# Patient Record
Sex: FEMALE | Race: WHITE | NOT HISPANIC OR LATINO | Employment: UNEMPLOYED | URBAN - METROPOLITAN AREA
[De-identification: names, ages, dates, MRNs, and addresses within clinical notes are randomized per-mention and may not be internally consistent; named-entity substitution may affect disease eponyms.]

---
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
## 2024-07-11 ENCOUNTER — HOSPITAL ENCOUNTER (EMERGENCY)
Facility: HOSPITAL | Age: 19
Discharge: HOME | End: 2024-07-11
Attending: EMERGENCY MEDICINE
Payer: COMMERCIAL

## 2024-07-11 ENCOUNTER — OFFICE VISIT (OUTPATIENT)
Dept: URGENT CARE | Facility: MEDICAL CENTER | Age: 19
End: 2024-07-11
Payer: COMMERCIAL

## 2024-07-11 ENCOUNTER — APPOINTMENT (EMERGENCY)
Dept: CT IMAGING | Facility: HOSPITAL | Age: 19
End: 2024-07-11
Payer: COMMERCIAL

## 2024-07-11 VITALS
TEMPERATURE: 98.3 F | DIASTOLIC BLOOD PRESSURE: 78 MMHG | OXYGEN SATURATION: 97 % | HEART RATE: 101 BPM | SYSTOLIC BLOOD PRESSURE: 130 MMHG | WEIGHT: 199 LBS | RESPIRATION RATE: 18 BRPM

## 2024-07-11 VITALS
OXYGEN SATURATION: 97 % | WEIGHT: 198.41 LBS | RESPIRATION RATE: 16 BRPM | DIASTOLIC BLOOD PRESSURE: 79 MMHG | TEMPERATURE: 98.4 F | HEART RATE: 88 BPM | SYSTOLIC BLOOD PRESSURE: 132 MMHG

## 2024-07-11 DIAGNOSIS — J03.90 ACUTE TONSILLITIS, UNSPECIFIED ETIOLOGY: Primary | ICD-10-CM

## 2024-07-11 DIAGNOSIS — J03.90 TONSILLITIS: Primary | ICD-10-CM

## 2024-07-11 LAB
ANION GAP SERPL CALCULATED.3IONS-SCNC: 7 MMOL/L (ref 4–13)
BASOPHILS # BLD MANUAL: 0 THOUSAND/UL (ref 0–0.1)
BASOPHILS NFR MAR MANUAL: 0 % (ref 0–1)
BUN SERPL-MCNC: 9 MG/DL (ref 5–25)
CALCIUM SERPL-MCNC: 9.3 MG/DL (ref 8.4–10.2)
CHLORIDE SERPL-SCNC: 102 MMOL/L (ref 96–108)
CO2 SERPL-SCNC: 27 MMOL/L (ref 21–32)
CREAT SERPL-MCNC: 0.77 MG/DL (ref 0.6–1.3)
EOSINOPHIL # BLD MANUAL: 0 THOUSAND/UL (ref 0–0.4)
EOSINOPHIL NFR BLD MANUAL: 0 % (ref 0–6)
ERYTHROCYTE [DISTWIDTH] IN BLOOD BY AUTOMATED COUNT: 13.5 % (ref 11.6–15.1)
EXT PREGNANCY TEST URINE: NEGATIVE
EXT. CONTROL: NORMAL
GFR SERPL CREATININE-BSD FRML MDRD: 112 ML/MIN/1.73SQ M
GLUCOSE SERPL-MCNC: 109 MG/DL (ref 65–140)
HCT VFR BLD AUTO: 42.1 % (ref 34.8–46.1)
HGB BLD-MCNC: 13.8 G/DL (ref 11.5–15.4)
LYMPHOCYTES # BLD AUTO: 65 % (ref 14–44)
LYMPHOCYTES # BLD AUTO: 7.18 THOUSAND/UL (ref 0.6–4.47)
MCH RBC QN AUTO: 27.8 PG (ref 26.8–34.3)
MCHC RBC AUTO-ENTMCNC: 32.8 G/DL (ref 31.4–37.4)
MCV RBC AUTO: 85 FL (ref 82–98)
MONOCYTES # BLD AUTO: 0.42 THOUSAND/UL (ref 0–1.22)
MONOCYTES NFR BLD: 4 % (ref 4–12)
NEUTROPHILS # BLD MANUAL: 2.96 THOUSAND/UL (ref 1.85–7.62)
NEUTS BAND NFR BLD MANUAL: 1 % (ref 0–8)
NEUTS SEG NFR BLD AUTO: 27 % (ref 43–75)
PLATELET # BLD AUTO: 108 THOUSANDS/UL (ref 149–390)
PLATELET BLD QL SMEAR: ABNORMAL
PMV BLD AUTO: 12.1 FL (ref 8.9–12.7)
POTASSIUM SERPL-SCNC: 4 MMOL/L (ref 3.5–5.3)
RBC # BLD AUTO: 4.96 MILLION/UL (ref 3.81–5.12)
RBC MORPH BLD: NORMAL
SODIUM SERPL-SCNC: 136 MMOL/L (ref 135–147)
VARIANT LYMPHS # BLD AUTO: 3 %
WBC # BLD AUTO: 10.56 THOUSAND/UL (ref 4.31–10.16)

## 2024-07-11 PROCEDURE — 99284 EMERGENCY DEPT VISIT MOD MDM: CPT

## 2024-07-11 PROCEDURE — 80048 BASIC METABOLIC PNL TOTAL CA: CPT | Performed by: PHYSICIAN ASSISTANT

## 2024-07-11 PROCEDURE — 86308 HETEROPHILE ANTIBODY SCREEN: CPT | Performed by: PHYSICIAN ASSISTANT

## 2024-07-11 PROCEDURE — 70491 CT SOFT TISSUE NECK W/DYE: CPT

## 2024-07-11 PROCEDURE — 85027 COMPLETE CBC AUTOMATED: CPT | Performed by: PHYSICIAN ASSISTANT

## 2024-07-11 PROCEDURE — 36415 COLL VENOUS BLD VENIPUNCTURE: CPT | Performed by: PHYSICIAN ASSISTANT

## 2024-07-11 PROCEDURE — 81025 URINE PREGNANCY TEST: CPT | Performed by: PHYSICIAN ASSISTANT

## 2024-07-11 PROCEDURE — 99285 EMERGENCY DEPT VISIT HI MDM: CPT | Performed by: PHYSICIAN ASSISTANT

## 2024-07-11 PROCEDURE — 96360 HYDRATION IV INFUSION INIT: CPT

## 2024-07-11 PROCEDURE — 85007 BL SMEAR W/DIFF WBC COUNT: CPT | Performed by: PHYSICIAN ASSISTANT

## 2024-07-11 PROCEDURE — 99213 OFFICE O/P EST LOW 20 MIN: CPT | Performed by: NURSE PRACTITIONER

## 2024-07-11 RX ORDER — AMOXICILLIN AND CLAVULANATE POTASSIUM 875; 125 MG/1; MG/1
1 TABLET, FILM COATED ORAL 2 TIMES DAILY
COMMUNITY
Start: 2024-07-08 | End: 2024-07-15

## 2024-07-11 RX ORDER — PREDNISONE 20 MG/1
20 TABLET ORAL DAILY
COMMUNITY
Start: 2024-07-08 | End: 2024-07-13

## 2024-07-11 RX ADMIN — IOHEXOL 85 ML: 350 INJECTION, SOLUTION INTRAVENOUS at 15:54

## 2024-07-11 RX ADMIN — SODIUM CHLORIDE 1000 ML: 0.9 INJECTION, SOLUTION INTRAVENOUS at 15:14

## 2024-07-11 NOTE — PROGRESS NOTES
[facility]        NAME: Sofy Sanchez is a 18 y.o. female  : 2005    MRN: [id number]  DATE: 2024  TIME: 2:10 PM    Assessment and Plan   Acute tonsillitis, unspecified etiology [J03.90]  1. Acute tonsillitis, unspecified etiology  Transfer to other facility        Patient in NAD and VSS upon exam. Discussed with mom and patient exam findings, concerned for abscess and failure of outpatient therapy with worsening symptoms. Recommend evaluation in ED with possible CT imaging. Mom and patient agreeable to plan and will go to [facility].     Patient Instructions       Follow up with PCP in 3-5 days.  Proceed to  ER if symptoms worsen.    If tests have been performed at [facility], our office will contact you with results if changes need to be made to the care plan discussed with you at the visit.  You can review your full results on [facility].    Chief Complaint     Chief Complaint   Patient presents with    Sore Throat     Sore throat x1 week. Pt was previously seen at Urgent Care & was given antibiotics and steroids. Pt here today with worsening sore throat and feels the meds are not helping her.          History of Present Illness       Patient seen at  on  and tested negative for strep and was given prednisone and augmentin  Patient has been on PO prednisone and Augmentin x 4 days with no improvement  Patient reports swelling has gotten worse  Denies fevers, cough  + mild congestion  Mom reports patient had wisdom teeth surgery and another tooth removed approx 9 months ago        Review of Systems   Review of Systems   HENT:  Positive for congestion, sore throat and trouble swallowing.    All other systems reviewed and are negative.        Current Medications       Current Outpatient Medications:     amoxicillin-clavulanate (AUGMENTIN) 875-125 mg per tablet, Take 1 tablet by mouth 2 (two) times a day, Disp: , Rfl:     predniSONE 20 mg tablet, Take 20 mg by mouth  daily, Disp: , Rfl:     MULTIPLE VITAMIN PO, Take by mouth, Disp: , Rfl:     norgestimate-ethinyl estradiol (Tri Femynor) 0.18/0.215/0.25 MG-35 MCG per tablet, Take 1 tablet by mouth daily, Disp: 90 tablet, Rfl: 0    Current Allergies     Allergies as of 07/11/2024    (No Known Allergies)            The following portions of the patient's history were reviewed and updated as appropriate: allergies, current medications, past family history, past medical history, past social history, past surgical history and problem list.     Past Medical History:   Diagnosis Date    Depression        Past Surgical History:   Procedure Laterality Date    NO PAST SURGERIES         Family History   Problem Relation Age of Onset    Hyperparathyroidism Mother     Hypothyroidism Mother     No Known Problems Father     Diabetes Maternal Grandmother     Heart disease Maternal Grandfather          Medications have been verified.        Objective   /78   Pulse 101   Temp 98.3 °F (36.8 °C)   Resp 18   Wt 90.3 kg (199 lb)   LMP 06/23/2024 (Approximate)   SpO2 97%   Patient's last menstrual period was 06/23/2024 (approximate).       Physical Exam     Physical Exam  Constitutional:       General: She is not in acute distress.     Appearance: Normal appearance. She is not ill-appearing.      Comments: Patient appears uncomfortable and tearful   HENT:      Head: Normocephalic and atraumatic.      Mouth/Throat:      Lips: Pink.      Mouth: Mucous membranes are moist.      Pharynx: Pharyngeal swelling, oropharyngeal exudate and posterior oropharyngeal erythema present.      Tonsils: 4+ on the right. 2+ on the left.      Comments: Right tonsil almost passing midline and is touching uvula  Neck:     Cardiovascular:      Rate and Rhythm: Normal rate.   Pulmonary:      Effort: Pulmonary effort is normal.   Lymphadenopathy:      Cervical: Cervical adenopathy present.   Skin:     General: Skin is warm and dry.   Neurological:      Mental  Status: She is alert.

## 2024-07-11 NOTE — Clinical Note
Sofy Sanchez was seen and treated in our emergency department on 7/11/2024.                Diagnosis:     Sofy  may return to school on return date.    She may return on this date: 07/15/2024         If you have any questions or concerns, please don't hesitate to call.      Gloria Lee PA-C    ______________________________           _______________          _______________  Hospital Representative                              Date                                Time

## 2024-07-11 NOTE — ED PROVIDER NOTES
History  Chief Complaint   Patient presents with    Medical Problem     +5 tonsils w white pus to R side. Steroids began 4 days ago without any improvement. Sent from urgent care for CT scan. Chills associated.     Sofy Sanchez is a 18 y.o. female with no significant past medical history presenting to ER complaining of sore throat over the past week.  Patient reports that about a week ago she began with bodyaches, headaches, chills, subjective fevers, and sore throat.  She was seen at an urgent care where she had a negative strep test but still started on Augmentin and prednisone.  Patient has been compliant with medications and has 1 dose of Augmentin 1 dose of prednisone left.  She reports that over the past week all of her symptoms have improved however she feels like her throat swelling has persisted.  Today she returned to the urgent care and they recommended she present to the ER to rule out peritonsillar abscess considering the swelling on the right tonsil.  Patient currently denies any sore throat, headache, body ache, chills, fevers, difficulty swallowing, difficulty speaking, difficulty breathing, or voice change.  She reports she has been eating and drinking normally.  She denies any pain medication.        Prior to Admission Medications   Prescriptions Last Dose Informant Patient Reported? Taking?   MULTIPLE VITAMIN PO   Yes No   Sig: Take by mouth   amoxicillin-clavulanate (AUGMENTIN) 875-125 mg per tablet   Yes No   Sig: Take 1 tablet by mouth 2 (two) times a day   norgestimate-ethinyl estradiol (Tri Femynor) 0.18/0.215/0.25 MG-35 MCG per tablet   No No   Sig: Take 1 tablet by mouth daily   predniSONE 20 mg tablet   Yes No   Sig: Take 20 mg by mouth daily      Facility-Administered Medications: None       Past Medical History:   Diagnosis Date    Depression        Past Surgical History:   Procedure Laterality Date    NO PAST SURGERIES         Family History   Problem Relation Age of Onset     Hyperparathyroidism Mother     Hypothyroidism Mother     No Known Problems Father     Diabetes Maternal Grandmother     Heart disease Maternal Grandfather      I have reviewed and agree with the history as documented.    E-Cigarette/Vaping     E-Cigarette/Vaping Substances     Social History     Tobacco Use    Smoking status: Never    Smokeless tobacco: Never   Substance Use Topics    Alcohol use: Never    Drug use: Never       Review of Systems   Constitutional:  Positive for chills (resolved). Negative for fever.   HENT:  Positive for sore throat (resolved). Negative for ear pain, trouble swallowing and voice change.    Eyes:  Negative for pain and visual disturbance.   Respiratory:  Negative for cough and shortness of breath.    Cardiovascular:  Negative for chest pain and palpitations.   Gastrointestinal:  Negative for abdominal pain and vomiting.   Genitourinary:  Negative for dysuria and hematuria.   Musculoskeletal:  Positive for myalgias (resolved). Negative for arthralgias and back pain.   Skin:  Negative for color change and rash.   Neurological:  Negative for seizures and syncope.   All other systems reviewed and are negative.      Physical Exam  Physical Exam  Vitals and nursing note reviewed.   Constitutional:       General: She is not in acute distress.     Appearance: She is well-developed. She is not ill-appearing, toxic-appearing or diaphoretic.   HENT:      Head: Normocephalic and atraumatic.      Right Ear: Tympanic membrane normal.      Left Ear: Tympanic membrane normal.      Mouth/Throat:      Lips: Pink. No lesions.      Mouth: Mucous membranes are moist. No injury, oral lesions or angioedema.      Tongue: No lesions. Tongue does not deviate from midline.      Palate: No mass and lesions.      Pharynx: Uvula midline. Oropharyngeal exudate and posterior oropharyngeal erythema present. No uvula swelling.      Tonsils: Tonsillar exudate present. 3+ on the right. 2+ on the left.   Eyes:       Conjunctiva/sclera: Conjunctivae normal.   Cardiovascular:      Rate and Rhythm: Normal rate and regular rhythm.      Heart sounds: No murmur heard.  Pulmonary:      Effort: Pulmonary effort is normal. No respiratory distress.      Breath sounds: Normal breath sounds. No stridor. No wheezing, rhonchi or rales.   Abdominal:      Palpations: Abdomen is soft.      Tenderness: There is no abdominal tenderness.   Musculoskeletal:         General: No swelling.      Cervical back: Neck supple.   Skin:     General: Skin is warm and dry.      Capillary Refill: Capillary refill takes less than 2 seconds.   Neurological:      Mental Status: She is alert.   Psychiatric:         Mood and Affect: Mood normal.         Vital Signs  ED Triage Vitals [07/11/24 1426]   Temperature Pulse Respirations Blood Pressure SpO2   98.4 °F (36.9 °C) 88 16 132/79 97 %      Temp Source Heart Rate Source Patient Position - Orthostatic VS BP Location FiO2 (%)   Oral Monitor Sitting Right arm --      Pain Score       No Pain           Vitals:    07/11/24 1426   BP: 132/79   Pulse: 88   Patient Position - Orthostatic VS: Sitting         Visual Acuity      ED Medications  Medications   sodium chloride 0.9 % bolus 1,000 mL (0 mL Intravenous Stopped 7/11/24 1634)   iohexol (OMNIPAQUE) 350 MG/ML injection (SINGLE-DOSE) 85 mL (85 mL Intravenous Given 7/11/24 1554)       Diagnostic Studies  Results Reviewed       Procedure Component Value Units Date/Time    Mononucleosis screen [[id number]]  (Abnormal) Collected: 07/11/24 1511    Lab Status: Final result Specimen: Blood from Arm, Left Updated: 07/12/24 0001     Monotest Positive    Manual Differential(PHLEBS Do Not Order) [[id number]]  (Abnormal) Collected: 07/11/24 1511    Lab Status: Final result Specimen: Blood from Arm, Left Updated: 07/11/24 1801     Segmented % 27 %      Bands % 1 %      Lymphocytes % 65 %      Monocytes % 4 %      Eosinophils % 0 %      Basophils % 0 %      Atypical Lymphocytes % 3 %       Absolute Neutrophils 2.96 Thousand/uL      Absolute Lymphocytes 7.18 Thousand/uL      Absolute Monocytes 0.42 Thousand/uL      Absolute Eosinophils 0.00 Thousand/uL      Absolute Basophils 0.00 Thousand/uL      Total Counted --     RBC Morphology Normal     Platelet Estimate Borderline    Basic metabolic panel [[id number]] Collected: 07/11/24 1511    Lab Status: Final result Specimen: Blood from Arm, Left Updated: 07/11/24 1533     Sodium 136 mmol/L      Potassium 4.0 mmol/L      Chloride 102 mmol/L      CO2 27 mmol/L      ANION GAP 7 mmol/L      BUN 9 mg/dL      Creatinine 0.77 mg/dL      Glucose 109 mg/dL      Calcium 9.3 mg/dL      eGFR 112 ml/min/1.73sq m     Narrative:      National Kidney Disease Foundation guidelines for Chronic Kidney Disease (CKD):     Stage 1 with normal or high GFR (GFR > 90 mL/min/1.73 square meters)    Stage 2 Mild CKD (GFR = 60-89 mL/min/1.73 square meters)    Stage 3A Moderate CKD (GFR = 45-59 mL/min/1.73 square meters)    Stage 3B Moderate CKD (GFR = 30-44 mL/min/1.73 square meters)    Stage 4 Severe CKD (GFR = 15-29 mL/min/1.73 square meters)    Stage 5 End Stage CKD (GFR <15 mL/min/1.73 square meters)  Note: GFR calculation is accurate only with a steady state creatinine    CBC and differential [[id number]]  (Abnormal) Collected: 07/11/24 1511    Lab Status: Final result Specimen: Blood from Arm, Left Updated: 07/11/24 1530     WBC 10.56 Thousand/uL      RBC 4.96 Million/uL      Hemoglobin 13.8 g/dL      Hematocrit 42.1 %      MCV 85 fL      MCH 27.8 pg      MCHC 32.8 g/dL      RDW 13.5 %      MPV 12.1 fL      Platelets 108 Thousands/uL     POCT pregnancy, urine [[id number]]  (Normal) Resulted: 07/11/24 1511    Lab Status: Final result Updated: 07/11/24 1511     EXT Preg Test, Ur Negative     Control Valid                   CT soft tissue neck   Final Result by Brandon Nguyen MD (07/11 1610)      Bilateral tonsillitis. No discrete tonsillar or peritonsillar collections identified.     "        Workstation performed: [id number]                    Procedures  Procedures         ED Course  ED Course as of 07/13/24 0916   Thu Jul 11, 2024   1624 CT soft tissue neck  Bilateral tonsillitis. No discrete tonsillar or peritonsillar collections identified.         CRAFFT      Flowsheet Row Most Recent Value   CRAFFT Initial Screen: During the past 12 months, did you:    1. Drink any alcohol (more than a few sips)?  No Filed at: 07/11/2024 1442   2. Smoke any marijuana or hashish No Filed at: 07/11/2024 1442   3. Use anything else to get high? (\"anything else\" includes illegal drugs, over the counter and prescription drugs, and things that you sniff or 'ndiaye')? No Filed at: 07/11/2024 1442                                              Medical Decision Making  Sofy Sanchez is a 18 y.o. female with no significant past medical history presenting to ER complaining of sore throat over the past week.  Was seen in urgent care last week and started on Augmentin and prednisone.  Patient reports that her sore throat and all symptoms have resolved apart from the swelling in her throat.  She reports she has been eating and drinking normally.  On exam patient is very well-appearing in no acute distress.  Vital signs are within normal limits.  Physical examination reveals pharyngeal erythema, tonsillar exudates, bilateral tonsillar swelling.  Uvula is midline.  Patient is tolerating secretions that any difficulty and speaking normally.  She is able to drink juice and water in the ER.  Examination is otherwise unremarkable.  Discussed with mother and patient that we will assess lab work to rule out leukocytosis or anemia and order CT to rule out peritonsillar abscess.  Mother and patient understanding and agreeable with plan.  Patient continues to deny any pain medication.    Lab work unremarkable.  CT shows bilateral tonsillitis and no abscess.  I discussed all imaging and lab results with patient and mother.  Patient " declines any Magic mouthwash or pain medication to control her symptoms as she reports that she is comfortable.  Discussed appropriate supportive care at home including gargling with salt water.  Advise close follow-up with PCP for reevaluation and advised strict return precautions to the ER.  Patient and mother are both understanding and agreeable with plan.  Patient has remained stable throughout stay in ER and stable for discharge.    Problems Addressed:  Tonsillitis: acute illness or injury    Amount and/or Complexity of Data Reviewed  Labs: ordered.  Radiology: ordered. Decision-making details documented in ED Course.    Risk  Prescription drug management.                 Disposition  Final diagnoses:   Tonsillitis     Time reflects when diagnosis was documented in both MDM as applicable and the Disposition within this note       Time User Action Codes Description Comment    7/11/2024  4:30 PM Gloria Lee Add [J03.90] Tonsillitis           ED Disposition       ED Disposition   Discharge    Condition   Stable    Date/Time   Thu Jul 11, 2024 1630    Comment   Sofy Sanchez discharge to home/self care.                   Follow-up Information       Follow up With Specialties Details Why Contact Info Additional Information    Malcolm Malcolm MD Pediatrics Schedule an appointment as soon as possible for a visit in 1 day  [address] [address]  [phone]       [facility] Emergency Department Emergency Medicine  If symptoms worsen [address] [facility] [id number] [phone] [facility] Emergency Department, [address] [place], [place], [address]    [facility] Pod Otolaryngology Schedule an appointment as soon as possible for a visit in 1 day  [address] [facility] [phone]             Discharge Medication List as of 7/11/2024  4:32 PM        CONTINUE these medications which have  NOT CHANGED    Details   amoxicillin-clavulanate (AUGMENTIN) 875-125 mg per tablet Take 1 tablet by mouth 2 (two) times a day, Starting Mon 7/8/2024, Until Mon 7/15/2024, Historical Med      MULTIPLE VITAMIN PO Take by mouth, Historical Med      norgestimate-ethinyl estradiol (Tri Femynor) 0.18/0.215/0.25 MG-35 MCG per tablet Take 1 tablet by mouth daily, Starting Mon 8/29/2022, Until Tue 11/29/2022, Normal      predniSONE 20 mg tablet Take 20 mg by mouth daily, Starting Mon 7/8/2024, Until Sat 7/13/2024, Historical Med                 PDMP Review       None            ED Provider  Electronically Signed by             Gloria Lee PA-C  07/13/24 0916

## 2024-07-12 LAB — HETEROPH AB SER QL: POSITIVE

## 2024-07-16 ENCOUNTER — NURSE TRIAGE (OUTPATIENT)
Age: 19
End: 2024-07-16

## 2024-07-16 NOTE — TELEPHONE ENCOUNTER
Regarding: yeast infection  ----- Message from Rhianna LOVE sent at 7/16/2024 11:28 AM EDT -----  Pt having symptoms of yeast infection

## 2024-07-29 ENCOUNTER — OFFICE VISIT (OUTPATIENT)
Dept: OBGYN CLINIC | Facility: CLINIC | Age: 19
End: 2024-07-29
Payer: COMMERCIAL

## 2024-07-29 VITALS
HEIGHT: 71 IN | BODY MASS INDEX: 28 KG/M2 | SYSTOLIC BLOOD PRESSURE: 122 MMHG | WEIGHT: 200 LBS | DIASTOLIC BLOOD PRESSURE: 72 MMHG

## 2024-07-29 DIAGNOSIS — Z30.09 COUNSELING FOR INITIATION OF BIRTH CONTROL METHOD: ICD-10-CM

## 2024-07-29 DIAGNOSIS — N76.0 VULVOVAGINITIS: Primary | ICD-10-CM

## 2024-07-29 DIAGNOSIS — Z11.3 SCREENING FOR STD (SEXUALLY TRANSMITTED DISEASE): ICD-10-CM

## 2024-07-29 LAB
BV WHIFF TEST VAG QL: ABNORMAL
CLUE CELLS SPEC QL WET PREP: ABNORMAL
PH SMN: 4.5 [PH]
SL AMB POCT WET MOUNT: ABNORMAL
T VAGINALIS VAG QL WET PREP: ABNORMAL
YEAST VAG QL WET PREP: ABNORMAL

## 2024-07-29 PROCEDURE — 87210 SMEAR WET MOUNT SALINE/INK: CPT | Performed by: PHYSICIAN ASSISTANT

## 2024-07-29 PROCEDURE — 87491 CHLMYD TRACH DNA AMP PROBE: CPT | Performed by: PHYSICIAN ASSISTANT

## 2024-07-29 PROCEDURE — 99214 OFFICE O/P EST MOD 30 MIN: CPT | Performed by: PHYSICIAN ASSISTANT

## 2024-07-29 PROCEDURE — 87070 CULTURE OTHR SPECIMN AEROBIC: CPT | Performed by: PHYSICIAN ASSISTANT

## 2024-07-29 PROCEDURE — 87591 N.GONORRHOEAE DNA AMP PROB: CPT | Performed by: PHYSICIAN ASSISTANT

## 2024-07-29 RX ORDER — DROSPIRENONE AND ETHINYL ESTRADIOL 0.02-3(28)
1 KIT ORAL DAILY
Qty: 28 TABLET | Refills: 3 | Status: SHIPPED | OUTPATIENT
Start: 2024-07-29

## 2024-07-29 NOTE — PROGRESS NOTES
Assessment & Plan   Diagnoses and all orders for this visit:    Vulvovaginitis  -     Genital Comprehensive Culture  -     POCT wet mount  Screening for STD (sexually transmitted disease)  -     Hepatitis B surface antigen; Future  -     Hepatitis C antibody; Future  -     HIV 1/2 AG/AB w Reflex [facility] for 2 yr old and above; Future  -     RPR-Syphilis Screening (Total Syphilis IGG/IGM); Future  -     Chlamydia/GC amplified DNA by PCR  -     POCT wet mount  Reviewed in office wet mount - few yeast noted, one clue cell, no trich; Genital and C/G cultures obtained - offered treatment for yeast vs treat based on culture results - patient states symptoms have calmed down so will wait to treat based on results. Encouraged trial of Luvena prebiotic/probiotic vaginal moisturizer/pH  - every couple of nights.   Encourage probiotic tablet like acidophilus BID with sx and then qd for maintenance; can also increase yogurt in diet;  Apply OTC hydrocortisone BID x 7-10 days for vulvar itching.  Discontinue use of soaps, shower body wash, sprays or douching in the genital area. Partner should also limit what soap he is using; Avoid all scented products (only plain water to wash vulva), perfume-free/dye-free detergent, no dryer sheets;  Wash with warm water only, gently pat dry, use low setting of hair dryer to dry area.  Avoid restrictive clothing.  Instead wear loose clothing and cotton undergarments.  Sleep without undergarments.  Consistent condom use may also reduce recurrence as well    Counseling for initiation of birth control method  -     drospirenone-ethinyl estradiol (UMBERTO) 3-0.02 MG per tablet; Take 1 tablet by mouth daily  Various contraceptive options were reviewed including, but not limited to, barrier methods (condoms, diaphragm), both combination (pill, patch, vaginal ring) and progesterone- only (pill, Depo provera, and Nexplanon), intrauterine devices (barbie, Mirena and Paragard). The mechanisms, risks,  benefits, and side effects of all methods were discussed. All questions have been answered to her satisfaction.  Desires OCP:   1)  Begin the pill on the Sunday of the week of your next period, starting with the first pill in the packet (If your period starts on a Sunday - start the pill that very same day; if your period starts any other day of the week - wait until the Sunday of that week to start with the first pill).  Take it the same time daily, within the same hour time frame (such as between 8 and 9am).  2) It common to experience some irregular bleeding when newly starting the pill.  This should resolve after 3 months of use.  Also minor side effects such as breast tenderness, minor headaches and nausea may occur, but typically resolve after continuing on the pill for at least 3 months.    3)  Call if you experience severe headaches, visual disturbances, chest pain, shortness of breath, abdominal pain, pain tingling or weakness in arms or legs.  4) Advise a back-up method, like condoms, during the first month on the OCP, if misses any pills, or is put on antibiotics. Reviewed missed pill protocol;   5) Advise safe sexual practices and the importance of condoms to prevent the transmission of STDs    Discussion  All questions answered at this time. Patient to call with any further questions/concerns.  RTO in 3 mo for pill check or sooner if needed    Subjective     HPI   Sofy Sanchez is a 19 y.o. female who presents for possible infection. 4 weeks ago - became sick - treated with an antibiotic for strep; turned out to test positive for mono; Developed red rash and irritation of vulva with/after antibiotic use. Rash seems to have resolved but still with vaginal irritation. Has been using coconut oil with no effect; Has not used any other OTC products;   No vulvar itch/burn; (+) vaginal itch/burn; No abn discharge or odor; No urinary sx - burning/pain/frequency/hematuria  No abd/pelvic pain; No  fever/chills  LMP - 7/16/24; Periods are reg q month and last 8 days;    Pt is sexually active in a mutually monog sexual relationship x 1 year; She has had prior partners and diagnosed/treated chlamydia about 2 years ago; her current partner has no prior partners. Occasionally - not every sexual encounter has been experiencing dryness/irritation.   OK to have STI testing done; Feels safe at home  Current contraception: condoms for the most part; On OCP through high school - went through 3 different ones - seemed to make acne worse at the time    Last Pap - no  History of abnormal Pap smear:   Last STI screen - none    Review of Systems   Constitutional:  Negative for activity change, fatigue, fever and unexpected weight change.   HENT:  Negative for congestion, dental problem, sinus pressure and sinus pain.    Eyes:  Negative for visual disturbance.   Respiratory:  Negative for cough, shortness of breath and wheezing.    Cardiovascular:  Negative for chest pain and leg swelling.   Gastrointestinal:  Negative for abdominal distention, abdominal pain, blood in stool, constipation, diarrhea, nausea and vomiting.   Endocrine: Negative for polydipsia.   Genitourinary:  Positive for vaginal pain (irritation). Negative for difficulty urinating, dyspareunia, dysuria, frequency, hematuria, menstrual problem, pelvic pain, urgency, vaginal bleeding and vaginal discharge.   Musculoskeletal:  Negative for arthralgias and back pain.   Allergic/Immunologic: Negative for environmental allergies.   Neurological:  Negative for dizziness, seizures and headaches.   Psychiatric/Behavioral:  Negative for dysphoric mood and sleep disturbance. The patient is not nervous/anxious.        The following portions of the patient's history were reviewed and updated as appropriate: allergies, current medications, past family history, past medical history, past social history, past surgical history, and problem list.         Past Medical History:  "  Diagnosis Date    Depression        Past Surgical History:   Procedure Laterality Date    NO PAST SURGERIES         Family History   Problem Relation Age of Onset    Hyperparathyroidism Mother     Hypothyroidism Mother     No Known Problems Father     Diabetes Maternal Grandmother     Heart disease Maternal Grandfather        Social History     Socioeconomic History    Marital status: Single     Spouse name: Not on file    Number of children: Not on file    Years of education: Not on file    Highest education level: Not on file   Occupational History    Not on file   Tobacco Use    Smoking status: Never    Smokeless tobacco: Never   Vaping Use    Vaping status: Never Used   Substance and Sexual Activity    Alcohol use: Never    Drug use: Never    Sexual activity: Yes     Partners: Male   Other Topics Concern    Not on file   Social History Narrative    Not on file     Social Determinants of Health     Financial Resource Strain: Not on file   Food Insecurity: Not on file   Transportation Needs: Not on file   Physical Activity: Not on file   Stress: Not on file   Social Connections: Not on file   Intimate Partner Violence: Not on file   Housing Stability: Not on file         Current Outpatient Medications:     drospirenone-ethinyl estradiol (UMBERTO) 3-0.02 MG per tablet, Take 1 tablet by mouth daily, Disp: 28 tablet, Rfl: 3    MULTIPLE VITAMIN PO, Take by mouth, Disp: , Rfl:     No Known Allergies    Objective   Vitals:    07/29/24 1058   BP: 122/72   BP Location: Left arm   Patient Position: Sitting   Cuff Size: Large   Weight: 90.7 kg (200 lb)   Height: 5' 11\" (1.803 m)     Physical Exam  Vitals reviewed.   Constitutional:       General: She is awake. She is not in acute distress.     Appearance: Normal appearance. She is well-developed and well-groomed. She is not ill-appearing, toxic-appearing or diaphoretic.   HENT:      Head: Normocephalic and atraumatic.   Eyes:      Conjunctiva/sclera: Conjunctivae normal. "   Abdominal:      General: There is no distension.      Palpations: Abdomen is soft. There is no hepatomegaly, splenomegaly or mass.      Tenderness: There is no abdominal tenderness.      Hernia: No hernia is present. There is no hernia in the left inguinal area or right inguinal area.   Genitourinary:     General: Normal vulva.      Exam position: Supine.      Pubic Area: No rash or pubic lice.       Labia:         Right: No rash, tenderness, lesion or injury.         Left: No rash, tenderness, lesion or injury.       Urethra: No prolapse, urethral pain, urethral swelling or urethral lesion.      Vagina: No signs of injury and foreign body. Erythema (mild) present. No vaginal discharge (normal physiological discharge noted), tenderness, bleeding, lesions or prolapsed vaginal walls.      Cervix: No cervical motion tenderness, discharge, friability, lesion, erythema or cervical bleeding.      Uterus: Not deviated, not enlarged, not fixed, not tender and no uterine prolapse.       Adnexa:         Right: No mass, tenderness or fullness.          Left: No mass, tenderness or fullness.        Comments: Mild erythema of vulva noted with no satellite lesions or ulcerations  Lymphadenopathy:      Lower Body: No right inguinal adenopathy. No left inguinal adenopathy.   Skin:     General: Skin is warm and dry.   Neurological:      Mental Status: She is alert and oriented to person, place, and time.   Psychiatric:         Mood and Affect: Mood and affect normal.         Speech: Speech normal.         Behavior: Behavior normal. Behavior is cooperative.         Thought Content: Thought content normal.         Judgment: Judgment normal.

## 2024-07-30 LAB
C TRACH DNA SPEC QL NAA+PROBE: NEGATIVE
N GONORRHOEA DNA SPEC QL NAA+PROBE: NEGATIVE

## 2024-08-02 LAB — BACTERIA GENITAL AEROBE CULT: NORMAL

## 2024-08-05 ENCOUNTER — NURSE TRIAGE (OUTPATIENT)
Age: 19
End: 2024-08-05

## 2024-08-05 NOTE — TELEPHONE ENCOUNTER
"Patient called with c/o pf burning after intercourse and today has clumpy white thick vaginal discharge, no odor. Denies any vaginal itching. Patient states she has had this type of discharge when she had yeast infection in the last year after prescribed antibiotics. Used oral pill of Diflucan for treatment.   Patient was aware of lab follow up results and the advised by Juan Carty to use Luvena vaginal moisturizer/ PH  with prebiotic and probiotics. Patient wants to know if she could also be prescribed the Diflucan with this?  [facility] on file was confirmed.          \"How many yeast infections have you had in the past 2 years?\" one    -If 1 or less, prescribe Diflucan 150mg PO. If no improvement after 1 dose treatment, please instruct patient to call back to schedule appointment. (should be seen within 3 days)    -If more than 4 or more yeast infections in a year or if they are recurrent, schedule appointment within 3 days.    For OB patients: if patient wishes to use over the counter monistat, recommend only the 7 day treatment.     Since patient was recently seen by provider and advised will send to provider to order donna follow up medications.     Answer Assessment - Initial Assessment Questions  1. SYMPTOM: \"What's the main symptom you're concerned about?\" (e.g., pain, itching, dryness)      Thick white clumpy vaginal discharge  2. LOCATION: \"Where is the  discharge located?\" (e.g., inside/outside, left/right)      Vaginal area  3. ONSET: \"When did the discharge  start?\"      today  4. PAIN: \"Is there any pain?\" If Yes, ask: \"How bad is it?\" (Scale: 1-10; mild, moderate, severe)      denies  5. ITCHING: \"Is there any itching?\" If Yes, ask: \"How bad is it?\" (Scale: 1-10; mild, moderate, severe)      denies  6. CAUSE: \"What do you think is causing the discharge?\" \"Have you had the same problem before? What happened then?\"      Recent ABX   7. OTHER SYMPTOMS: \"Do you have any other symptoms?\" (e.g., " "fever, itching, vaginal bleeding, pain with urination, injury to genital area, vaginal foreign body)      denies  8. PREGNANCY: \"Is there any chance you are pregnant?\" \"When was your last menstrual period?\" Recent testing was negative    Protocols used: Vaginal Symptoms-ADULT-[place]    "

## 2024-08-06 DIAGNOSIS — N89.8 VAGINAL DISCHARGE: Primary | ICD-10-CM

## 2024-08-06 RX ORDER — FLUCONAZOLE 150 MG/1
TABLET ORAL
Qty: 2 TABLET | Refills: 0 | Status: SHIPPED | OUTPATIENT
Start: 2024-08-06 | End: 2024-08-08

## 2024-08-06 NOTE — TELEPHONE ENCOUNTER
LM on  for patient reviewing: can send Rx for Diflucan; vaginal discharge can fluctuate throughout your cycle depending on hormonal fluctuations as well - may change in amount and consistency; if no itch/burn/irritation - usually not infectious; encouraged her to be diligent about preventative measures including starting a probiotic whether it be oral and/or vaginal;  If you can make sure [facility] so she has access to the notes and recommendations listed out for her - thanks

## 2024-08-18 DIAGNOSIS — Z30.09 COUNSELING FOR INITIATION OF BIRTH CONTROL METHOD: ICD-10-CM

## 2024-08-19 RX ORDER — DROSPIRENONE AND ETHINYL ESTRADIOL 0.02-3(28)
1 KIT ORAL DAILY
Qty: 84 TABLET | Refills: 1 | Status: SHIPPED | OUTPATIENT
Start: 2024-08-19

## 2025-01-27 ENCOUNTER — HOSPITAL ENCOUNTER (EMERGENCY)
Facility: HOSPITAL | Age: 20
Discharge: HOME | End: 2025-01-27
Attending: EMERGENCY MEDICINE
Payer: COMMERCIAL

## 2025-01-27 VITALS
OXYGEN SATURATION: 96 % | DIASTOLIC BLOOD PRESSURE: 81 MMHG | RESPIRATION RATE: 18 BRPM | TEMPERATURE: 97.4 F | SYSTOLIC BLOOD PRESSURE: 150 MMHG | HEART RATE: 88 BPM

## 2025-01-27 DIAGNOSIS — S01.81XA FACIAL LACERATION, INITIAL ENCOUNTER: ICD-10-CM

## 2025-01-27 DIAGNOSIS — T14.8XXA ANIMAL BITE: Primary | ICD-10-CM

## 2025-01-27 PROCEDURE — 12013 RPR F/E/E/N/L/M 2.6-5.0 CM: CPT | Performed by: EMERGENCY MEDICINE

## 2025-01-27 PROCEDURE — 99284 EMERGENCY DEPT VISIT MOD MDM: CPT | Performed by: EMERGENCY MEDICINE

## 2025-01-27 PROCEDURE — 99283 EMERGENCY DEPT VISIT LOW MDM: CPT

## 2025-01-27 RX ORDER — LIDOCAINE HYDROCHLORIDE AND EPINEPHRINE 10; 10 MG/ML; UG/ML
5 INJECTION, SOLUTION INFILTRATION; PERINEURAL ONCE
Status: COMPLETED | OUTPATIENT
Start: 2025-01-27 | End: 2025-01-27

## 2025-01-27 RX ORDER — GINSENG 100 MG
1 CAPSULE ORAL ONCE
Status: COMPLETED | OUTPATIENT
Start: 2025-01-27 | End: 2025-01-27

## 2025-01-27 RX ORDER — GINSENG 100 MG
1 CAPSULE ORAL 2 TIMES DAILY
Qty: 28 G | Refills: 0 | Status: SHIPPED | OUTPATIENT
Start: 2025-01-27

## 2025-01-27 RX ORDER — LORAZEPAM 1 MG/1
1 TABLET ORAL ONCE
Status: COMPLETED | OUTPATIENT
Start: 2025-01-27 | End: 2025-01-27

## 2025-01-27 RX ADMIN — LIDOCAINE HYDROCHLORIDE,EPINEPHRINE BITARTRATE 5 ML: 10; .01 INJECTION, SOLUTION INFILTRATION; PERINEURAL at 11:46

## 2025-01-27 RX ADMIN — AMOXICILLIN AND CLAVULANATE POTASSIUM 1 TABLET: 875; 125 TABLET, COATED ORAL at 12:25

## 2025-01-27 RX ADMIN — BACITRACIN 1 LARGE APPLICATION: 500 OINTMENT TOPICAL at 12:26

## 2025-01-27 RX ADMIN — LORAZEPAM 1 MG: 1 TABLET ORAL at 11:36

## 2025-01-27 NOTE — Clinical Note
Sofy Sanchez was seen and treated in our emergency department on 1/27/2025.                Diagnosis:     Sofy  may return to gym class or sports after being cleared by physician.    She may return on this date:          If you have any questions or concerns, please don't hesitate to call.      Won Price, DO    ______________________________           _______________          _______________  Hospital Representative                              Date                                Time

## 2025-01-27 NOTE — Clinical Note
Sofy Sanchez was seen and treated in our emergency department on 1/27/2025.                Diagnosis:     Sofy  may return to gym class or sports after being cleared by physician, may return to school on return date.    She may return on this date: 02/03/2025         If you have any questions or concerns, please don't hesitate to call.      Won Price, DO    ______________________________           _______________          _______________  Hospital Representative                              Date                                Time

## 2025-01-27 NOTE — ED PROCEDURE NOTE
"PROCEDURE  Universal Protocol:  Consent: Verbal consent obtained.  Risks and benefits: risks, benefits and alternatives were discussed  Consent given by: patient  Time out: Immediately prior to procedure a \"time out\" was called to verify the correct patient, procedure, equipment, support staff and site/side marked as required.  Timeout called at: 1/27/2025 12:31 PM.  Patient understanding: patient states understanding of the procedure being performed  Patient consent: the patient's understanding of the procedure matches consent given  Procedure consent: procedure consent matches procedure scheduled  Relevant documents: relevant documents present and verified  Test results: test results available and properly labeled  Site marked: the operative site was marked  Radiology Images displayed and confirmed. If images not available, report reviewed: imaging studies available  Required items: required blood products, implants, devices, and special equipment available  Patient identity confirmed: verbally with patient  Laceration repair    Date/Time: 1/27/2025 12:31 PM    Performed by: Won Price DO  Authorized by: Won Price DO  Body area: head/neck  Location details: upper lip  Full thickness lip laceration: no  Vermilion border involved: yes  Lip laceration height: up to half vertical height  Laceration length: 3 cm  Tendon involvement: none  Nerve involvement: none  Vascular damage: no  Anesthesia: local infiltration    Anesthesia:  Local Anesthetic: lidocaine 1% without epinephrine  Anesthetic total: 10 mL    Sedation:  Patient sedated: no      Wound Dehiscence:  Superficial Wound Dehiscence: simple closure      Procedure Details:  Preparation: Patient was prepped and draped in the usual sterile fashion.  Irrigation solution: saline  Irrigation method: syringe  Amount of cleaning: standard  Debridement: none  Degree of undermining: none  Skin closure: 6-0 nylon  Number of sutures: 5  Technique: " simple  Approximation: close  Approximation difficulty: simple  Lip approximation: vermillion border well aligned  Dressing: antibiotic ointment  Patient tolerance: Patient tolerated the procedure well with no immediate complications           Won Price,   01/27/25 1412

## 2025-01-27 NOTE — ED PROVIDER NOTES
Time reflects when diagnosis was documented in both MDM as applicable and the Disposition within this note       Time User Action Codes Description Comment    1/27/2025 12:19 PM Won Price [T14.8XXA] Animal bite     1/27/2025 12:19 PM Won Price [S01.81XA] Facial laceration, initial encounter           ED Disposition       ED Disposition   Discharge    Condition   Stable    Date/Time   Mon Jan 27, 2025 12:19 PM    Comment   Sofy Sanchez discharge to home/self care.                   Assessment & Plan       Medical Decision Making  19-year-old female with noted dog bite to the face    Spoke with plastic surgery currently in the operating room might be a delay in terms of repair given the length of time that this injury occurred greater than 12 hours ago decision was made to repair in the emergency department.  Patient is currently on antibiotics the wound was repaired by myself.  With good approximation counseled the patient and family members that there likely will be a scar and will need plastics follow-up for revision if necessary.  Discussion with plastic surgery team recommend follow-up in 5 days for suture removal and further possible revision.  Patient currently on antibiotics a dose of Ativan was given to help with anxiety around the repair.  Given strict return precautions when to come back to the emergency department.  Counseled the patient on scar reduction techniques.    Risk  OTC drugs.  Prescription drug management.             Medications   LORazepam (ATIVAN) tablet 1 mg (1 mg Oral Given 1/27/25 1136)   lidocaine-epinephrine (XYLOCAINE/EPINEPHRINE) 1 %-1:100,000 injection 5 mL (5 mL Infiltration Given by Other 1/27/25 1146)   amoxicillin-clavulanate (AUGMENTIN) 875-125 mg per tablet 1 tablet (1 tablet Oral Given 1/27/25 1225)   bacitracin topical ointment 1 large application (1 large application Topical Given 1/27/25 1226)       ED Risk Strat Scores                                               History of Present Illness       Chief Complaint   Patient presents with    Animal Bite     Pt was bit by dog yesterday on upper lip and back. Pt states the dog is up to date on vaccines        Past Medical History:   Diagnosis Date    Depression       Past Surgical History:   Procedure Laterality Date    NO PAST SURGERIES        Family History   Problem Relation Age of Onset    Hyperparathyroidism Mother     Hypothyroidism Mother     No Known Problems Father     Diabetes Maternal Grandmother     Heart disease Maternal Grandfather       Social History     Tobacco Use    Smoking status: Never    Smokeless tobacco: Never   Vaping Use    Vaping status: Never Used   Substance Use Topics    Alcohol use: Never    Drug use: Never      E-Cigarette/Vaping    E-Cigarette Use Never User       E-Cigarette/Vaping Substances      I have reviewed and agree with the history as documented.     19-year-old female presents emergency department with noted dog bite to the face, she has a laceration on the right side of her lip that goes through the vermilion border.  He was seen in outside hospital that injury occurred yesterday evening at approximately 5 PM.  Wound was not repaired at that point in time as she received tetanus and was given Augmentin the dog is up-to-date on vaccinations            Review of Systems   Constitutional:  Negative for chills and fever.   HENT:  Negative for ear pain and sore throat.    Eyes:  Negative for pain and visual disturbance.   Respiratory:  Negative for cough and shortness of breath.    Cardiovascular:  Negative for chest pain and palpitations.   Gastrointestinal:  Negative for abdominal pain and vomiting.   Genitourinary:  Negative for dysuria and hematuria.   Musculoskeletal:  Negative for arthralgias and back pain.   Skin:  Negative for color change and rash.   Neurological:  Negative for seizures and syncope.   All other systems reviewed and are  negative.          Objective       ED Triage Vitals [01/27/25 1051]   Temperature Pulse Blood Pressure Respirations SpO2 Patient Position - Orthostatic VS   (!) 97.4 °F (36.3 °C) 88 150/81 18 96 % Lying      Temp Source Heart Rate Source BP Location FiO2 (%) Pain Score    Temporal Monitor Right arm -- No Pain      Vitals      Date and Time Temp Pulse SpO2 Resp BP Pain Score FACES Pain Rating User   01/27/25 1051 97.4 °F (36.3 °C) 88 96 % 18 150/81 No Pain -- KV            Physical Exam  Vitals and nursing note reviewed.   Constitutional:       Appearance: She is well-developed.   HENT:      Head: Normocephalic and atraumatic.        Mouth/Throat:      Pharynx: No oropharyngeal exudate.   Eyes:      Pupils: Pupils are equal, round, and reactive to light.   Neck:      Trachea: No tracheal deviation.   Cardiovascular:      Rate and Rhythm: Normal rate.      Heart sounds: Normal heart sounds. No murmur heard.     No friction rub. No gallop.   Pulmonary:      Effort: Pulmonary effort is normal. No respiratory distress.      Breath sounds: No wheezing or rales.   Abdominal:      General: There is no distension.      Palpations: Abdomen is soft.      Tenderness: There is no abdominal tenderness. There is no guarding or rebound.   Musculoskeletal:         General: No tenderness. Normal range of motion.      Cervical back: Normal range of motion and neck supple.   Skin:     General: Skin is warm.      Findings: No rash.   Neurological:      Mental Status: She is alert and oriented to person, place, and time.         Results Reviewed       None            No orders to display       Procedures    ED Medication and Procedure Management   Prior to Admission Medications   Prescriptions Last Dose Informant Patient Reported? Taking?   MULTIPLE VITAMIN PO   Yes No   Sig: Take by mouth   drospirenone-ethinyl estradiol (Vestura) 3-0.02 MG per tablet   No No   Sig: TAKE 1 TABLET BY MOUTH EVERY DAY      Facility-Administered  Medications: None     Discharge Medication List as of 1/27/2025 12:20 PM        START taking these medications    Details   bacitracin topical ointment 500 units/g topical ointment Apply 1 large application topically 2 (two) times a day, Starting Mon 1/27/2025, Normal           CONTINUE these medications which have NOT CHANGED    Details   drospirenone-ethinyl estradiol (Vestura) 3-0.02 MG per tablet TAKE 1 TABLET BY MOUTH EVERY DAY, Starting Mon 8/19/2024, Normal      MULTIPLE VITAMIN PO Take by mouth, Historical Med           No discharge procedures on file.  ED SEPSIS DOCUMENTATION   Time reflects when diagnosis was documented in both MDM as applicable and the Disposition within this note       Time User Action Codes Description Comment    1/27/2025 12:19 PM Won Price [T14.8XXA] Animal bite     1/27/2025 12:19 PM Won Price [S01.81XA] Facial laceration, initial encounter                  Won Price DO  01/28/25 0719

## 2025-01-31 ENCOUNTER — TELEPHONE (OUTPATIENT)
Age: 20
End: 2025-01-31

## 2025-01-31 NOTE — TELEPHONE ENCOUNTER
Lvm to confirm appt for 02/03 with Ankush in [place]. Advised pt to callback to r/s or cancel if needed.

## 2025-02-03 ENCOUNTER — OFFICE VISIT (OUTPATIENT)
Dept: PLASTIC SURGERY | Facility: CLINIC | Age: 20
End: 2025-02-03
Payer: COMMERCIAL
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
## 2025-02-03 DIAGNOSIS — S01.551A DOG BITE OF VERMILION OF UPPER LIP, INITIAL ENCOUNTER: Primary | ICD-10-CM

## 2025-02-03 PROCEDURE — 99203 OFFICE O/P NEW LOW 30 MIN: CPT

## 2025-02-03 PROCEDURE — 15853 REMOVAL SUTR/STAPL XREQ ANES: CPT

## 2025-02-04 NOTE — PROGRESS NOTES
[facility]astic and Reconstruct[facility] Surgery  [address], [address], [place] [address]  [phone]    Patient Identification: Sofy Sanchez is a 19 y.o. female     History of Present Illness: The patient is a 19 y.o.  year-old female  who presents to the office for a new patient consultation regarding a laceration to the upper lip s/p dog bite.   Patient suffered a dog bite to the upper lip 1/26/25. Reported to the  ED 1/27/25 where it was repaired by the ED provider. Pt was discharged with PO Augmentin and referral to our office.  Patient is doing well. Denies fevers, chills, signs of infection or significant pain. Is applying antibiotic ointment to the wound daily. She has questions regarding return to sports. Patient denies factors contributing to poor wound healing such as diabetes, tobacco use, chronic steroid use, immunotherapy/chemotherapy drug use and blood thinning mediations.    Patient has no complaints at this time.    Past Medical History:   Diagnosis Date    Depression           Review of Systems  Constitutional: Denies fevers, chills or pain.  Skin: Denies any warmth, erythema, edema or mucopurulent drainage.     Physical Exam  General: AAOx3, no distress, pleasant  Face: Upper lip laceration with simple interrupted sutures, removed. Well-approximated, no dehiscence or signs of infection. Mild edema.     Assessment and Plan:  The patient is an 19 y.o.  year-old female who presents to the office for a new patient consultation regarding a dog bite repaired in  ED.    -At today's visit sutures removed from laceration. Healing well, no concerns for infection or poor healing. Advise pt to apply Aquaphor to the area daily for moisturize. Discussed scar care, at 4 weeks she may begin using silicone scar creams and scar massage. Demonstration on scar massage shown to pt. All questions answered. Wear sunscreen to the area when outdoors.  -Discussed return to sports. Do not recommend  trauma or direct impact to the area at minimum 2 weeks after repair. If she wishes to return to sports, encourage her to take precautionary measures such as wearing a mask. Provided pt with steri strips she may apply to the area if feels necessary during sports.  -She may return in 1 week to assess healing.  -The patient is to call the office with any questions or concerns. All of the patient's questions were answered at this time and they agree with the plan of care.      Dayami Tinoco PA-C  [facility] Plastic and Reconstructive Surgery

## 2025-04-24 DIAGNOSIS — H66.93 BILATERAL OTITIS MEDIA, UNSPECIFIED OTITIS MEDIA TYPE: Primary | ICD-10-CM

## 2025-06-24 NOTE — TELEPHONE ENCOUNTER
"Sofy has been ill with mono x 2 1/2 weeks.  Now feeling better.  Vagina is feeling very dry/irritated. Denies vaginal discharge/itching. Reports painful intercourse. No new sexual partner.     She would like appt for evaluation and to discuss birth control option.     Advised open to air at bedtime, avoid perfume soaps and products, recommend cotton underwear only, no thongs, wear looser fit clothing, change out of wet clothing after exercise and/or bathing suits ASAP. May use A&D/Aquaphor oint externally to alleviate irration, wash with Cetaphil cleanser, watch sugar and carb intake.     Appt provided for 7/19/2024  Reason for Disposition   Patient wants to be seen    Answer Assessment - Initial Assessment Questions  1. SYMPTOM: \"What's the main symptom you're concerned about?\" (e.g., pain, itching, dryness)      Vaginal dryness  2. LOCATION: \"Where is the  dryness located?\" (e.g., inside/outside, left/right)      vaginal  3. ONSET: \"When did the  dryness  start?\"      In the past few days  4. PAIN: \"Is there any pain?\" If Yes, ask: \"How bad is it?\" (Scale: 1-10; mild, moderate, severe)      Pain with intercourse  5. ITCHING: \"Is there any itching?\" If Yes, ask: \"How bad is it?\" (Scale: 1-10; mild, moderate, severe)      Mild itching  6. CAUSE: \"What do you think is causing the discharge?\" \"Have you had the same problem before? What happened then?\"      Normal discharge  7. OTHER SYMPTOMS: \"Do you have any other symptoms?\" (e.g., fever, itching, vaginal bleeding, pain with urination, injury to genital area, vaginal foreign body)  Diagnosed with mono one week ago. Has been sick for 2 1/2 weeks        8. PREGNANCY: \"Is there any chance you are pregnant?\" \"When was your last menstrual period?\"      LMP- pregnancy test in emergency room one week ago, negative    Protocols used: Vaginal Symptoms-ADULT-[place]    " room air